# Patient Record
Sex: FEMALE | Race: AMERICAN INDIAN OR ALASKA NATIVE | NOT HISPANIC OR LATINO | Employment: PART TIME | URBAN - METROPOLITAN AREA
[De-identification: names, ages, dates, MRNs, and addresses within clinical notes are randomized per-mention and may not be internally consistent; named-entity substitution may affect disease eponyms.]

---

## 2019-11-18 PROBLEM — K50.113 CROHN'S DISEASE OF LARGE INTESTINE WITH FISTULA: Status: ACTIVE | Noted: 2019-11-18

## 2019-11-18 PROBLEM — K50.913: Status: ACTIVE | Noted: 2019-11-18

## 2019-12-09 ENCOUNTER — APPOINTMENT (OUTPATIENT)
Dept: LAB | Facility: HOSPITAL | Age: 29
End: 2019-12-09
Attending: NURSE PRACTITIONER
Payer: MEDICAID

## 2020-01-15 PROBLEM — K60.3 PERIANAL FISTULA: Status: ACTIVE | Noted: 2019-04-10

## 2020-01-15 PROBLEM — K60.3 FISTULA-IN-ANO: Status: ACTIVE | Noted: 2017-03-14

## 2020-01-15 PROBLEM — F33.2 MDD (MAJOR DEPRESSIVE DISORDER), RECURRENT SEVERE, WITHOUT PSYCHOSIS: Status: ACTIVE | Noted: 2017-05-10

## 2020-07-08 PROBLEM — M07.60 ARTHROPATHY IN CROHN'S DISEASE: Status: ACTIVE | Noted: 2020-07-08

## 2020-07-08 PROBLEM — K50.90 ARTHROPATHY IN CROHN'S DISEASE: Status: ACTIVE | Noted: 2020-07-08

## 2020-07-27 PROBLEM — K21.9 GERD (GASTROESOPHAGEAL REFLUX DISEASE): Chronic | Status: ACTIVE | Noted: 2020-07-27

## 2020-08-12 PROBLEM — K59.00 CONSTIPATION: Status: ACTIVE | Noted: 2020-08-12

## 2021-05-12 ENCOUNTER — PATIENT MESSAGE (OUTPATIENT)
Dept: RESEARCH | Facility: HOSPITAL | Age: 31
End: 2021-05-12

## 2022-09-15 PROBLEM — N97.1 FEMALE INFERTILITY DUE TO BLOCK OF FALLOPIAN TUBE: Status: ACTIVE | Noted: 2022-09-15

## 2022-09-15 PROBLEM — N70.11 HYDROSALPINX: Status: ACTIVE | Noted: 2022-09-15

## 2023-01-12 ENCOUNTER — SPECIALTY PHARMACY (OUTPATIENT)
Dept: PHARMACY | Facility: CLINIC | Age: 33
End: 2023-01-12

## 2023-01-12 NOTE — TELEPHONE ENCOUNTER
Judith with Dr. Gallardo's office called to check the status of the Stelara sent on 1/5. Informed office the Rx is still pending authorization. Forwarding to GI/Derm team for f/u.

## 2023-01-13 NOTE — TELEPHONE ENCOUNTER
Staff messaged [facility] requesting prescription sent to [facility] to work up on authorization, order was sent to [facility] pharmacy.

## 2023-01-18 NOTE — TELEPHONE ENCOUNTER
Incoming call from nurse @ infusion center checking status on Stelara Rx. Informed Rx was originally sent to [facility], so [facility] had to request a new Rx which was received yesterday. Routing [facility] team for assigning and referral work up.

## 2023-01-18 NOTE — TELEPHONE ENCOUNTER
New order for Stelara received. PA is not required. Pt has [employer] plan with [employer].     Hello, this is Ross with [facility].  We are working on your prescription that your doctor has sent us. We will be working with your insurance to get this approved for you. We will be calling you along the way with updates on your medication.  If you have any questions, you can reach us at [phone].    Welcome call outcome: No answer/Unable to leave voicemail    Staff message sent to the provider so that the Stelara induction infusion can be scheduled. Forwarding Rx to initial consult. Will pend consult accordingly when the infusion is scheduled.

## 2023-01-19 NOTE — TELEPHONE ENCOUNTER
Outgoing call. Trying to reach the patient to let her know that Stelara is approved. Need to determine if she had her induction infusion or when she will have it. Initial should be pended for 7 weeks after the induction infusion.

## 2023-01-20 NOTE — TELEPHONE ENCOUNTER
Incoming call from patient was transferred to me. Patient inquired about the induction process for Stelara stating that she was not aware of the self-injection portion. Provided patient with education on the induction process, which is a one-time infusion via IV, followed by self-injections every 8 weeks. She also expressed concern about self-injecting. Advised that extensive education and injection training will be provided prior to dispensing the medication.     Patient also reported that she has a colorectal surgery scheduled for 2/1 (verified via chart). Advised that Stelara would likely not be initiated until after surgery. [facility] to follow up once she is cleared to start Stelara. She voiced understanding.    Messaged GI and surgery providers to advise on tentative start date for Stelara.

## 2023-03-29 ENCOUNTER — SPECIALTY PHARMACY (OUTPATIENT)
Dept: PHARMACY | Facility: CLINIC | Age: 33
End: 2023-03-29

## 2023-03-29 DIAGNOSIS — K50.113 CROHN'S DISEASE OF LARGE INTESTINE WITH FISTULA: Primary | ICD-10-CM

## 2023-03-29 NOTE — TELEPHONE ENCOUNTER
Specialty Pharmacy - Initial Clinical Assessment    Specialty Medication Orders Linked to Encounter      Flowsheet Row Most Recent Value   Medication #1 ustekinumab (STELARA) 90 mg/mL Syrg syringe (Order#[id number], Rx#[id number])          Patient Diagnosis   K50.113 - Crohn's disease of large intestine with fistula    Subjective    Allison Snato is a 33 y.o. female, who is followed by the specialty pharmacy service for management and education.    Recent Encounters       Date Type Provider Description    03/29/2023 Specialty Pharmacy [facility] Initial Clinical Assessment    01/12/2023 Specialty Pharmacy Syed Strauss PharmD Referral Authorization          Clinical call attempts since last clinical assessment   1/19/2023 10:16 PM - Specialty Pharmacy - Clinical Assessment by Sang Hernadez PharmD  3/29/2023  7:06 PM - Specialty Pharmacy - Clinical Assessment by Sang Hernadez, [facility]     Current Outpatient Medications   Medication Sig    ARIPiprazole (ABILIFY) 2 MG Tab 1 tablet Orally Once a day for 30 days    cholecalciferol, vitamin D3, 2,000 unit Chew Take 1 tablet by mouth once daily.    clindamycin (CLEOCIN) 300 MG capsule Take 300 mg by mouth every 8 (eight) hours.    ergocalciferol (ERGOCALCIFEROL) 50,000 unit Cap Take 1 capsule (50,000 Units total) by mouth every 7 days.    leflunomide (ARAVA) 20 MG Tab Take 1 tablet (20 mg total) by mouth once daily.    linaCLOtide (LINZESS) 72 mcg Cap capsule Take 1 capsule (72 mcg total) by mouth before breakfast.    ondansetron (ZOFRAN-ODT) 4 MG TbDL Take 1 tablet (4 mg total) by mouth every 8 (eight) hours as needed.    pantoprazole (PROTONIX) 40 MG tablet Take 1 tablet (40 mg total) by mouth once daily. At least 30 minutes before breakfast    polyethylene glycol (GLYCOLAX) 17 gram PwPk Take 17 g by mouth once a week.     sulfaSALAzine (AZULFIDINE) 500 mg Tab Take 1 tablet (500 mg total) by mouth 2 (two) times a day.    traMADoL (ULTRAM) 50 mg tablet 100 mg.     ustekinumab (STELARA) 90 mg/mL Syrg syringe Inject 1 mL (90 mg total) into the skin every 8 weeks.    ustekinumab (STELARA) 90 mg/mL Syrg syringe Inject 1 mL (90 mg total) into the skin every 8 weeks.    venlafaxine (EFFEXOR-XR) 75 MG 24 hr capsule Take 75 mg by mouth.   Last reviewed on 3/29/2023  2:43 PM by Sang Hernadez, PharmD    Review of patient's allergies indicates:   Allergen Reactions    Ciprofloxacin Hives    Sulfa (sulfonamide antibiotics)    Last reviewed on  3/29/2023 2:17 PM by Sang Hernadez    Drug Interactions    Drug interactions evaluated: yes  Clinically relevant drug interactions identified: no  Provided the patient with educational material regarding drug interactions: not applicable           Assessment Questions - Documented Responses      Flowsheet Row Most Recent Value   Assessment    Medication Reconciliation completed for patient No   During the past 4 weeks, has patient missed any activities due to condition or medication? No   During the past 4 weeks, did patient have any of the following urgent care visits? None   Goals of Therapy Status Discussed (new start)   Status of the patients ability to self-administer: Is Able   All education points have been covered with patient? Yes, supplemental printed education provided   Welcome packet contents reviewed and discussed with patient? Yes   Assesment completed? Yes   Plan Therapy being initiated   Do you need to open a clinical intervention (i-vent)? No   Do you want to schedule first shipment? Yes          Refill Questions - Documented Responses      Flowsheet Row Most Recent Value   Patient Availability and HIPAA Verification    Does patient want to proceed with activity? Yes   HIPAA/medical authority confirmed? Yes   Relationship to patient of person spoken to? Self   Refill Screening Questions    When does the patient need to receive the medication? 04/03/23   Refill Delivery Questions    How will the patient receive the medication? Mail  "  When does the patient need to receive the medication? 04/03/23   Shipping Address Home   Address in [facility] confirmed and updated if neccessary? Yes   Expected Copay ($) 10.35   Is the patient able to afford the medication copay? Yes   Payment Method CC on file   Days supply of Refill 56   Supplies needed? No supplies needed   Refill activity completed? Yes   Refill activity plan Refill scheduled   Shipment/Pickup Date: 03/30/23            Objective    She has a past medical history of Anxiety, Crohn's disease (2016), Depression, GERD (gastroesophageal reflux disease), Inflammatory bowel disease (2016), and Perianal fistula.    Tried/failed medications: Humira, Imuran, Entyvio, and Remicade    BP Readings from Last 4 Encounters:   02/06/23 107/67   02/01/23 106/72   12/27/22 110/69   12/22/22 113/74     Ht Readings from Last 4 Encounters:   02/06/23 5' 5" (1.651 m)   02/01/23 5' 5" (1.651 m)   12/22/22 5' 5" (1.651 m)   09/15/22 5' 5" (1.651 m)     Wt Readings from Last 4 Encounters:   02/06/23 82.4 kg (181 lb 9.6 oz)   02/01/23 77 kg (169 lb 12.8 oz)   12/22/22 76.7 kg (169 lb)   09/15/22 73.3 kg (161 lb 9.6 oz)     No results for input(s): CREATININE, ALT, AST, HEPBSAG, HEPBSAB, HEPBCAB in the last 2160 hours.  The goals of prescribed drug therapy management include:  Supporting patient to meet the prescriber's medical treatment objectives  Improving or maintaining quality of life  Maintaining optimal therapy adherence  Minimizing and managing side effects      Goals of Therapy Status: Discussed (new start)    Assessment/Plan  Patient plans to start therapy on 04/03/23      Indication, dosage, appropriateness, effectiveness, safety and convenience of her specialty medication(s) were reviewed today.     Patient Education   Patient received education on the following:   Expectations and possible outcomes of therapy  Proper use, timely administration, and missed dose management  Duration of therapy  Side " effects, including prevention, minimization, and management  Contraindications and safety precautions  New or changed medications, including prescribe and over the counter medications and supplements  Reviews recommended vaccinations, as appropriate  Storage, safe handling, and disposal        Tasks added this encounter   No tasks added.   Tasks due within next 3 months   3/27/2023 - Clinical - Initial Assessment (Manual Add)     [facility]  [address] [place] [id number]  Phone: [phone]  Fax: [phone]

## 2023-05-29 ENCOUNTER — PATIENT MESSAGE (OUTPATIENT)
Dept: PHARMACY | Facility: CLINIC | Age: 33
End: 2023-05-29

## 2023-05-29 ENCOUNTER — SPECIALTY PHARMACY (OUTPATIENT)
Dept: PHARMACY | Facility: CLINIC | Age: 33
End: 2023-05-29

## 2023-05-29 NOTE — TELEPHONE ENCOUNTER
Specialty Pharmacy - Refill Coordination    Specialty Medication Orders Linked to Encounter      Flowsheet Row Most Recent Value   Medication #1 ustekinumab (STELARA) 90 mg/mL Syrg syringe (Order#[id number], Rx#)            Refill Questions - Documented Responses      Flowsheet Row Most Recent Value   Refill Screening Questions    Changes to allergies? No   Changes to medications? No   New conditions since last clinic visit? No   Unplanned office visit, urgent care, ED, or hospital admission in the last 4 weeks? No   How does patient/caregiver feel medication is working? Fair  [Pt reported she does not have blood in stoool but still has fistulas. She is seeing CRS soon and will reach out to [facility] if any therapy changes are made.]   Financial problems or insurance changes? No   How many doses of your specialty medications were missed in the last 4 weeks? 0   Would patient like to speak to a pharmacist? No   When does the patient need to receive the medication? 06/06/23   Refill Delivery Questions    How will the patient receive the medication? Mail   When does the patient need to receive the medication? 06/06/23   Shipping Address Home   Address in [facility] confirmed and updated if neccessary? Yes   Expected Copay ($) 0   Is the patient able to afford the medication copay? Yes   Payment Method zero copay   Days supply of Refill 56   Supplies needed? No supplies needed   Refill activity completed? Yes   Refill activity plan Refill scheduled   Shipment/Pickup Date: 06/01/23            Current Outpatient Medications   Medication Sig    ARIPiprazole (ABILIFY) 2 MG Tab 1 tablet Orally Once a day for 30 days    cholecalciferol, vitamin D3, 2,000 unit Chew Take 1 tablet by mouth once daily.    clindamycin (CLEOCIN) 300 MG capsule Take 300 mg by mouth every 8 (eight) hours.    ergocalciferol (ERGOCALCIFEROL) 50,000 unit Cap Take 1 capsule (50,000 Units total) by mouth every 7 days.    ketorolac 60 mg/2 mL Syrg 2 ml  Intramuscular in office    leflunomide (ARAVA) 20 MG Tab Take 1 tablet (20 mg total) by mouth once daily.    linaCLOtide (LINZESS) 72 mcg Cap capsule Take 1 capsule (72 mcg total) by mouth before breakfast.    methylcellulose (CITRUCEL, SUCROSE,) oral powder Take 3.4 g by mouth 2 (two) times daily.    naproxen (NAPROSYN) 500 MG tablet     ondansetron (ZOFRAN-ODT) 4 MG TbDL Take 1 tablet (4 mg total) by mouth every 8 (eight) hours as needed.    pantoprazole (PROTONIX) 40 MG tablet Take 1 tablet (40 mg total) by mouth once daily. At least 30 minutes before breakfast    polyethylene glycol (GLYCOLAX) 17 gram PwPk Take 17 g by mouth once a week.    sulfaSALAzine (AZULFIDINE) 500 mg Tab Take 1 tablet (500 mg total) by mouth 2 (two) times a day.    traMADoL (ULTRAM) 50 mg tablet 100 mg.    ustekinumab (STELARA) 90 mg/mL Syrg syringe Inject 1 mL (90 mg total) into the skin every 8 weeks.    ustekinumab (STELARA) 90 mg/mL Syrg syringe Inject 1 mL (90 mg total) into the skin every 8 weeks.    venlafaxine (EFFEXOR-XR) 75 MG 24 hr capsule Take 75 mg by mouth.   Last reviewed on 4/28/2023  2:02 PM by Dana Zayas MA    Review of patient's allergies indicates:   Allergen Reactions    Ciprofloxacin Hives    Sulfa (sulfonamide antibiotics)     Last reviewed on  5/9/2023 12:03 PM by Jesus Vasquez      Tasks added this encounter   No tasks added.   Tasks due within next 3 months   No tasks due.     Suzette Ta, PharmD  [facility]  [address] [place] [id number]  Phone: [phone]  Fax: [phone]

## 2023-07-19 ENCOUNTER — PATIENT MESSAGE (OUTPATIENT)
Dept: RESEARCH | Facility: HOSPITAL | Age: 33
End: 2023-07-19

## 2023-07-20 ENCOUNTER — PATIENT MESSAGE (OUTPATIENT)
Dept: PHARMACY | Facility: CLINIC | Age: 33
End: 2023-07-20

## 2023-07-24 ENCOUNTER — PATIENT MESSAGE (OUTPATIENT)
Dept: RESEARCH | Facility: HOSPITAL | Age: 33
End: 2023-07-24

## 2023-07-27 ENCOUNTER — PATIENT MESSAGE (OUTPATIENT)
Dept: PHARMACY | Facility: CLINIC | Age: 33
End: 2023-07-27

## 2023-07-31 ENCOUNTER — PATIENT MESSAGE (OUTPATIENT)
Dept: RESEARCH | Facility: HOSPITAL | Age: 33
End: 2023-07-31

## 2023-07-31 ENCOUNTER — PATIENT MESSAGE (OUTPATIENT)
Dept: PHARMACY | Facility: CLINIC | Age: 33
End: 2023-07-31

## 2023-08-03 ENCOUNTER — SPECIALTY PHARMACY (OUTPATIENT)
Dept: PHARMACY | Facility: CLINIC | Age: 33
End: 2023-08-03

## 2023-08-04 NOTE — TELEPHONE ENCOUNTER
Attempted to call pt back regarding potential side effects but no answer. Unable to leave a voicemail message.

## 2023-08-07 NOTE — TELEPHONE ENCOUNTER
Specialty Pharmacy - Refill Coordination    Specialty Medication Orders Linked to Encounter      Flowsheet Row Most Recent Value   Medication #1 ustekinumab (STELARA) 90 mg/mL Syrg syringe (Order#[id number], Rx#[id number])            Refill Questions - Documented Responses      Flowsheet Row Most Recent Value   Patient Availability and HIPAA Verification    Does patient want to proceed with activity? Yes   HIPAA/medical authority confirmed? Yes   Relationship to patient of person spoken to? Self   Refill Screening Questions    Changes to allergies? No   Changes to medications? Yes  [Lithium and seroquel]   New conditions since last clinic visit? No   Unplanned office visit, urgent care, ED, or hospital admission in the last 4 weeks? No   How does patient/caregiver feel medication is working? Good   Financial problems or insurance changes? No   How many doses of your specialty medications were missed in the last 4 weeks? 0   Would patient like to speak to a pharmacist? Yes  [Patient reports significant dizziness and headache after last injection.]   When does the patient need to receive the medication? 08/08/23   Refill Delivery Questions    How will the patient receive the medication? Mail   When does the patient need to receive the medication? 08/08/23   Shipping Address Home   Address in [facility] confirmed and updated if neccessary? Yes   Expected Copay ($) 0   Is the patient able to afford the medication copay? Yes   Payment Method zero copay   Days supply of Refill 56   Supplies needed? No supplies needed   Refill activity completed? Yes   Refill activity plan Refill scheduled   Shipment/Pickup Date: 08/07/23            Current Outpatient Medications   Medication Sig    ARIPiprazole (ABILIFY) 2 MG Tab 1 tablet Orally Once a day for 30 days    cholecalciferol, vitamin D3, 2,000 unit Chew Take 1 tablet by mouth once daily.    clindamycin (CLEOCIN) 300 MG capsule Take 300 mg by mouth every 8 (eight) hours.     ergocalciferol (ERGOCALCIFEROL) 50,000 unit Cap Take 1 capsule (50,000 Units total) by mouth every 7 days.    hydrocortisone-pramoxine (PROCTOFOAM-HS) rectal foam Place 1 applicator rectally 2 (two) times daily.    ketorolac 60 mg/2 mL Syrg 2 ml Intramuscular in office    leflunomide (ARAVA) 20 MG Tab Take 1 tablet (20 mg total) by mouth once daily.    linaCLOtide (LINZESS) 72 mcg Cap capsule Take 1 capsule (72 mcg total) by mouth before breakfast.    methylcellulose (CITRUCEL, SUCROSE,) oral powder Take 3.4 g by mouth 2 (two) times daily.    naproxen (NAPROSYN) 500 MG tablet     ondansetron (ZOFRAN-ODT) 4 MG TbDL Take 1 tablet (4 mg total) by mouth every 8 (eight) hours as needed.    pantoprazole (PROTONIX) 40 MG tablet Take 1 tablet (40 mg total) by mouth once daily. At least 30 minutes before breakfast    sulfaSALAzine (AZULFIDINE) 500 mg Tab Take 1 tablet (500 mg total) by mouth 2 (two) times a day.    traMADoL (ULTRAM) 50 mg tablet 100 mg.    ustekinumab (STELARA) 90 mg/mL Syrg syringe Inject 1 mL (90 mg total) into the skin every 8 weeks.    ustekinumab (STELARA) 90 mg/mL Syrg syringe Inject 1 mL (90 mg total) into the skin every 8 weeks.    venlafaxine (EFFEXOR-XR) 75 MG 24 hr capsule Take 75 mg by mouth.   Last reviewed on 8/7/2023 10:58 AM by Kristi Lopez, PharmD    Review of patient's allergies indicates:   Allergen Reactions    Ciprofloxacin Hives    Sulfa (sulfonamide antibiotics)     Last reviewed on  7/5/2023 9:33 AM by Britany Javed      Tasks added this encounter   No tasks added.   Tasks due within next 3 months   No tasks due.     Kristi Lopez, PharmD  [facility] - [facility] Pharm[facility]  [address] [place] [address]  Phone: [phone]  Fax: [phone]

## 2023-12-29 ENCOUNTER — PATIENT MESSAGE (OUTPATIENT)
Dept: URGENT CARE | Facility: CLINIC | Age: 33
End: 2023-12-29

## 2024-09-06 ENCOUNTER — SOCIAL WORK (OUTPATIENT)
Dept: ADMINISTRATIVE | Facility: OTHER | Age: 34
End: 2024-09-06

## 2024-09-06 NOTE — PROGRESS NOTES
Sw received a referral to assist with therapy resources. [facility] to see Patient. However, the Clinic does not have a therapist available to see her. Sw mailed Patient a letter explaining this. She was provided the contact information for some in-network providers. Sw encouraged Patient to contact one to schedule an assessment if she was still in need of services.    Chelsea Gallardo, SURAJ    [phone]